# Patient Record
Sex: FEMALE | Race: WHITE | NOT HISPANIC OR LATINO | Employment: UNEMPLOYED | URBAN - METROPOLITAN AREA
[De-identification: names, ages, dates, MRNs, and addresses within clinical notes are randomized per-mention and may not be internally consistent; named-entity substitution may affect disease eponyms.]

---

## 2023-02-23 ENCOUNTER — OFFICE VISIT (OUTPATIENT)
Dept: PRIMARY CARE CLINIC | Facility: CLINIC | Age: 43
End: 2023-02-23
Payer: COMMERCIAL

## 2023-02-23 VITALS
WEIGHT: 141.13 LBS | DIASTOLIC BLOOD PRESSURE: 62 MMHG | BODY MASS INDEX: 25.01 KG/M2 | TEMPERATURE: 99 F | HEART RATE: 83 BPM | SYSTOLIC BLOOD PRESSURE: 108 MMHG | RESPIRATION RATE: 16 BRPM | OXYGEN SATURATION: 99 % | HEIGHT: 63 IN

## 2023-02-23 DIAGNOSIS — E88.810 METABOLIC SYNDROME: ICD-10-CM

## 2023-02-23 DIAGNOSIS — E28.2 PCOS (POLYCYSTIC OVARIAN SYNDROME): ICD-10-CM

## 2023-02-23 DIAGNOSIS — E78.5 HYPERLIPIDEMIA, UNSPECIFIED HYPERLIPIDEMIA TYPE: ICD-10-CM

## 2023-02-23 DIAGNOSIS — L65.9 HAIR LOSS: ICD-10-CM

## 2023-02-23 DIAGNOSIS — F32.A ANXIETY AND DEPRESSION: ICD-10-CM

## 2023-02-23 DIAGNOSIS — E78.41 ELEVATED LIPOPROTEIN(A): ICD-10-CM

## 2023-02-23 DIAGNOSIS — F41.9 ANXIETY AND DEPRESSION: ICD-10-CM

## 2023-02-23 DIAGNOSIS — E34.9 HORMONE DISORDER: Primary | ICD-10-CM

## 2023-02-23 DIAGNOSIS — R63.5 WEIGHT GAIN: ICD-10-CM

## 2023-02-23 DIAGNOSIS — E55.9 VITAMIN D DEFICIENCY: ICD-10-CM

## 2023-02-23 PROCEDURE — 3074F PR MOST RECENT SYSTOLIC BLOOD PRESSURE < 130 MM HG: ICD-10-PCS | Mod: CPTII,S$GLB,, | Performed by: FAMILY MEDICINE

## 2023-02-23 PROCEDURE — 99999 PR PBB SHADOW E&M-NEW PATIENT-LVL III: ICD-10-PCS | Mod: PBBFAC,,, | Performed by: FAMILY MEDICINE

## 2023-02-23 PROCEDURE — 1159F MED LIST DOCD IN RCRD: CPT | Mod: CPTII,S$GLB,, | Performed by: FAMILY MEDICINE

## 2023-02-23 PROCEDURE — 99215 OFFICE O/P EST HI 40 MIN: CPT | Mod: S$GLB,,, | Performed by: FAMILY MEDICINE

## 2023-02-23 PROCEDURE — 99999 PR PBB SHADOW E&M-NEW PATIENT-LVL III: CPT | Mod: PBBFAC,,, | Performed by: FAMILY MEDICINE

## 2023-02-23 PROCEDURE — 3008F PR BODY MASS INDEX (BMI) DOCUMENTED: ICD-10-PCS | Mod: CPTII,S$GLB,, | Performed by: FAMILY MEDICINE

## 2023-02-23 PROCEDURE — 1159F PR MEDICATION LIST DOCUMENTED IN MEDICAL RECORD: ICD-10-PCS | Mod: CPTII,S$GLB,, | Performed by: FAMILY MEDICINE

## 2023-02-23 PROCEDURE — 3074F SYST BP LT 130 MM HG: CPT | Mod: CPTII,S$GLB,, | Performed by: FAMILY MEDICINE

## 2023-02-23 PROCEDURE — 3008F BODY MASS INDEX DOCD: CPT | Mod: CPTII,S$GLB,, | Performed by: FAMILY MEDICINE

## 2023-02-23 PROCEDURE — 3078F DIAST BP <80 MM HG: CPT | Mod: CPTII,S$GLB,, | Performed by: FAMILY MEDICINE

## 2023-02-23 PROCEDURE — 1160F PR REVIEW ALL MEDS BY PRESCRIBER/CLIN PHARMACIST DOCUMENTED: ICD-10-PCS | Mod: CPTII,S$GLB,, | Performed by: FAMILY MEDICINE

## 2023-02-23 PROCEDURE — 99215 PR OFFICE/OUTPT VISIT, EST, LEVL V, 40-54 MIN: ICD-10-PCS | Mod: S$GLB,,, | Performed by: FAMILY MEDICINE

## 2023-02-23 PROCEDURE — 3078F PR MOST RECENT DIASTOLIC BLOOD PRESSURE < 80 MM HG: ICD-10-PCS | Mod: CPTII,S$GLB,, | Performed by: FAMILY MEDICINE

## 2023-02-23 PROCEDURE — 1160F RVW MEDS BY RX/DR IN RCRD: CPT | Mod: CPTII,S$GLB,, | Performed by: FAMILY MEDICINE

## 2023-02-23 RX ORDER — SAXAGLIPTIN 5 MG/1
5 TABLET, FILM COATED ORAL DAILY
Qty: 90 TABLET | Refills: 1 | Status: SHIPPED | OUTPATIENT
Start: 2023-02-23 | End: 2023-08-25

## 2023-02-23 RX ORDER — SEMAGLUTIDE 1.34 MG/ML
1 INJECTION, SOLUTION SUBCUTANEOUS
Qty: 3 PEN | Refills: 1 | Status: SHIPPED | OUTPATIENT
Start: 2023-02-23 | End: 2023-08-25 | Stop reason: SDUPTHER

## 2023-02-23 RX ORDER — DIETHYLPROPION HYDROCHLORIDE 75 MG/1
75 TABLET, EXTENDED RELEASE ORAL EVERY MORNING
Qty: 90 TABLET | Refills: 0 | Status: SHIPPED | OUTPATIENT
Start: 2023-02-23 | End: 2023-06-05 | Stop reason: SDUPTHER

## 2023-02-23 RX ORDER — ME-TETRAHYDROFOLATE/B12/HRB236 1-1-500 MG
1 CAPSULE ORAL DAILY
Qty: 90 CAPSULE | Refills: 1 | Status: SHIPPED | OUTPATIENT
Start: 2023-02-23 | End: 2023-03-27 | Stop reason: SDUPTHER

## 2023-02-23 RX ORDER — DIETHYLPROPION HYDROCHLORIDE 75 MG/1
TABLET, EXTENDED RELEASE ORAL
COMMUNITY
Start: 2019-11-23 | End: 2023-02-23 | Stop reason: SDUPTHER

## 2023-02-23 RX ORDER — ASPIRIN 325 MG
TABLET, DELAYED RELEASE (ENTERIC COATED) ORAL
COMMUNITY
End: 2023-02-23 | Stop reason: SDUPTHER

## 2023-02-23 RX ORDER — SPIRONOLACTONE 100 MG/1
TABLET, FILM COATED ORAL
COMMUNITY
Start: 2019-11-23 | End: 2023-02-23 | Stop reason: SDUPTHER

## 2023-02-23 RX ORDER — FINASTERIDE 1 MG/1
TABLET, FILM COATED ORAL
COMMUNITY
End: 2023-02-23 | Stop reason: SDUPTHER

## 2023-02-23 RX ORDER — SAXAGLIPTIN 5 MG/1
TABLET, FILM COATED ORAL
COMMUNITY
End: 2023-02-23 | Stop reason: SDUPTHER

## 2023-02-23 RX ORDER — ASPIRIN 325 MG
50000 TABLET, DELAYED RELEASE (ENTERIC COATED) ORAL
Qty: 12 CAPSULE | Refills: 1 | Status: SHIPPED | OUTPATIENT
Start: 2023-02-23 | End: 2024-02-05 | Stop reason: SDUPTHER

## 2023-02-23 RX ORDER — FINASTERIDE 1 MG/1
1 TABLET, FILM COATED ORAL DAILY
Qty: 90 TABLET | Refills: 1 | Status: SHIPPED | OUTPATIENT
Start: 2023-02-23 | End: 2023-08-25 | Stop reason: SDUPTHER

## 2023-02-23 RX ORDER — SPIRONOLACTONE 100 MG/1
100 TABLET, FILM COATED ORAL DAILY
Qty: 90 TABLET | Refills: 1 | Status: SHIPPED | OUTPATIENT
Start: 2023-02-23 | End: 2023-08-25 | Stop reason: SDUPTHER

## 2023-02-23 RX ORDER — SEMAGLUTIDE 1.34 MG/ML
INJECTION, SOLUTION SUBCUTANEOUS
COMMUNITY
Start: 2019-11-23 | End: 2023-02-23 | Stop reason: SDUPTHER

## 2023-02-23 NOTE — PROGRESS NOTES
"        [facility]       [address] [place] [address]      [phone]     Kelly Mooney MD         .      Office Visit  02/23/2023      Subjective      HPI:  Mello Carreon is a 42 y.o. female presents today in clinic for "Follow-up  ."     Ear from [facility].  She was doing great she has a history of PCOS and metabolic syndrome and still seeing dr medrano. Patient is needing to get labs, patient is feeling great and is satisfied with her med regimen.  Diet is good.  She stays active.  Weight is stable.  Sleep is good..    REVIEW OF SYMPTOMS  General: negative   Psychological ROS: negative ..  Endocrine ROS: negative  Ophthalmic ROS: negative   ENT ROS: negative .  Cardiovascular ROS: negative   Breast ROS: negative   Respiratory ROS: negative   Gastrointestinal ROS: negative   Genito-Urinary ROS: negative   Musculoskeletal ROS: negative   Dermatological ROS: negative  Neurological ROS: negative  Hematological and Lymphatic ROS: denies             The following were updated and reviewed by myself in the chart: medications, past medical history, past surgical history, family history, social history, and allergies.     MEDICATIONS    Current Outpatient Medications:     cyanocobalamin (VITAMIN B-12) 1000 MCG tablet, Take 2,500 mcg by mouth. , Disp: , Rfl:     cholecalciferol, vitamin D3, 1,250 mcg (50,000 unit) capsule, Take 1 capsule (50,000 Units total) by mouth every 7 days., Disp: 12 capsule, Rfl: 1    diethylpropion (TENUATE) 75 mg SR tablet, Take 1 tablet (75 mg total) by mouth every morning., Disp: 90 tablet, Rfl: 0    finasteride (PROPECIA) 1 mg tablet, Take 1 tablet (1 mg total) by mouth once daily., Disp: 90 tablet, Rfl: 1    [id number] (RHEUMATE, WITH QUATREFOLIC,) 1-1-500 mg Cap, Take 1 tablet by mouth once daily., Disp: 90 capsule, Rfl: 1    SAXagliptin (ONGLYZA) 5 mg Tab tablet, Take 1 tablet (5 mg total) " "by mouth once daily., Disp: 90 tablet, Rfl: 1    semaglutide (OZEMPIC) 1 mg/dose (4 mg/3 mL), Inject 1 mg into the skin every 7 days., Disp: 3 pen, Rfl: 1    spironolactone (ALDACTONE) 100 MG tablet, Take 1 tablet (100 mg total) by mouth once daily., Disp: 90 tablet, Rfl: 1    ALLERGIES  Review of patient's allergies indicates:   Allergen Reactions    Cefaclor Swelling     Closes throat up    Cephalexin Swelling    Penicillins Anaphylaxis    Sulfa (sulfonamide antibiotics) Swelling    Sulfamethoxazole-trimethoprim Swelling    Metformin     Levofloxacin Palpitations     Causes heart to race          /62   Pulse 83   Temp 98.8 °F (37.1 °C)   Resp 16   Ht 5' 3" (1.6 m)   Wt 64 kg (141 lb 1.5 oz)   SpO2 99%   BMI 24.99 kg/m²   Wt Readings from Last 3 Encounters:   02/23/23 64 kg (141 lb 1.5 oz)   11/13/15 84.5 kg (186 lb 2.9 oz)   10/20/15 84.1 kg (185 lb 6.5 oz)     BP Readings from Last 3 Encounters:   02/23/23 108/62   11/13/15 104/72   10/20/15 96/68          PHYSICAL EXAM:     Physical Exam:    Constitutional:   General Appearance:  healthy and alert  Body Habitus: normoweight  Level of Distress: NAD   Ambulation: ambulates independently  Psychiatric:   Appearance: well dressed, appropriate  Speech rapid  Cognition: memory normal   Orientation: orientated to time, place and person   Mood: normal and pleasant and appropriate  Movement: normal and sitting calmly  Head: normocephalic and atraumatic   Eyes:    Eye Inspection: grossly normal , EOMI  Pupils: PERRLA  Conjunctivae: clear  Lids: normal eyelids  ENMT:     Neck:   Neck: normal, supple with no adenopathy or mass  Thyroid: normal thyroid appearance and examination  Lungs: Respiratory exam normal and unlabored;   Cardiovascular: Cardiovascular exam normal with RRR  Abdomen: soft and non-distended with no tenderness   Musculoskeletal: Musculoskeletal exam is normal  for strength, tone and ROM  Extremities: Extremities are normal on exam  Neurologic: " Neurological exam is normal and grossly intact  Skin:   Inspection and palpation: Skin exam is normal  Nails: Nails are normal  Hair Texture: hair is normal        ASSESSMENT AND PLAN      1. Hormone disorder  Common findings associated with PCOS are many but hormone imbalance in women is the defining feature of PCOS. Related symptoms include irregular periods, weight gain, infertility, acne and excess hair growth. Discussed how diet and/or medication can help. Medications and diet can be effective in controlling PCOS as well as other otc supplements such as kate-inositol choline, Glucorein HOP, DIM 200mg for androgenic symptoms, and saw palmetto 250mg for hair loss or thinning.  Controlled on meds   -     Progesterone; Future; Expected date: 02/23/2023  -     DHEA-Sulfate; Future; Expected date: 02/23/2023  -     Estrone; Future; Expected date: 02/23/2023  -     Estradiol; Future; Expected date: 02/23/2023  -     Luteinizing Hormone; Future; Expected date: 02/23/2023  -     Follicle Stimulating Hormone; Future; Expected date: 02/23/2023  -     Testosterone, Free; Future; Expected date: 02/23/2023  -     Testosterone, Total, LC/MS/MS; Future; Expected date: 02/23/2023  -     Misc Sendout Test, Blood cardio !Q- advanced lipid panel; Future; Expected date: 02/23/2023  -     Misc Sendout Test, Blood lipoprotein A cardio IQ; Future; Expected date: 02/23/2023    2. Elevated lipoprotein(a)  Strong family history.  Discuss starting medications.  We will look at labs.  Otherwise all cardiometabolic risk factors are controlled on current regimen  -     diethylpropion (TENUATE) 75 mg SR tablet; Take 1 tablet (75 mg total) by mouth every morning.  Dispense: 90 tablet; Refill: 0  -     [id number] (RHEUMATE, WITH QUATREFOLIC,) 1-1-500 mg Cap; Take 1 tablet by mouth once daily.  Dispense: 90 capsule; Refill: 1  -     CRP, High Sensitivity; Future; Expected date: 02/23/2023  -     Lipoprotein A (LPA); Future;  Expected date: 02/23/2023  -     Cardio Iq(R) Apolipoprotein B; Future; Expected date: 02/23/2023    3. Metabolic syndrome  Pathophysiology of insulin resistance discussed with patient and therapeutic options were explained.  Dietary and lifestyle changes are recommended for the treatment of insulin resistance.   Low carbohydrate diet and/or possible intermittent fasting is recommended for insulin resistance and/or prediabetes.  Non FDA approved treatment options include but is not limited to GLP's, SGLT-2's, biguanides, and other glucose support supplements. Patient will notify us with any concerns or complaints regarding treatment plan.Weight loss is strongly encouraged and is emphasized to help reduce risk of diabetes.  Has been on combination of Onglyza and Ozempic and has done very well and levels are stable and improved.  No changes to current regimen  -     CBC Auto Differential; Future; Expected date: 02/23/2023  -     Comprehensive Metabolic Panel; Future; Expected date: 02/23/2023  -     Hemoglobin A1C; Future; Expected date: 02/23/2023  -     SAXagliptin (ONGLYZA) 5 mg Tab tablet; Take 1 tablet (5 mg total) by mouth once daily.  Dispense: 90 tablet; Refill: 1  -     semaglutide (OZEMPIC) 1 mg/dose (4 mg/3 mL); Inject 1 mg into the skin every 7 days.  Dispense: 3 pen; Refill: 1    4. PCOS (polycystic ovarian syndrome)  Common findings associated with PCOS are many but hormone imbalance in women is the defining feature of PCOS. Related symptoms include irregular periods, weight gain, infertility, acne and excess hair growth. Discussed how diet and/or medication can help. Medications and diet can be effective in controlling PCOS as well as other otc supplements such as kate-inositol choline, Glucorein HOP, DIM 200mg for androgenic symptoms, and saw palmetto 250mg for hair loss or thinning.  Doing well on current regimen  -     SAXagliptin (ONGLYZA) 5 mg Tab tablet; Take 1 tablet (5 mg total) by mouth once  daily.  Dispense: 90 tablet; Refill: 1  -     semaglutide (OZEMPIC) 1 mg/dose (4 mg/3 mL); Inject 1 mg into the skin every 7 days.  Dispense: 3 pen; Refill: 1    5. Anxiety and depression  It is advised to identify triggers for anxiety and consider the impact of anxious thinking on functioning. Discussed strategies to regulate symptoms and need for compliance with treatment.  Therapy or counseling may be necessary if current regimen is ineffective. Patient will report any worsening or change in anxiety symptoms if necessary.  Controlled on medication    6. Hyperlipidemia, unspecified hyperlipidemia type  Reviewed importance of advanced lipid profiles. Advise daily exercise. Diet is recommended. Patients are encouraged to obtain healthy BMI weight. Risks associated with high cholesterol are well established and include but are not limited to heart disease, stroke and peripheral vascular disease. Patient should not smoke. Goal LDL particle number is <1000 and ApoB <70. Basic LDL is below 100 or below 70 if diabetic. Some non-FDA approved dietary supplements that may be beneficial to patient include but is not limited to high fiber diet at least 30g daily; niacin ER non flush free variety 500mg-1,000mg; Omega-3 fish oil DHA+EPA = 1,000mg twice daily; baby dose aspirin 81mg; co-enzyme q10 500mg to help reduce statin-induced myalgia; red rice yeast 1200mg twice daily; berberine 1000mg-2000mg daily. Patient is encouraged to exercise routinely and adhere to heart healthy diet with Mediterranean diet showing the most consistent data to help with lipid management.      7. Hair loss  Controlled  -     finasteride (PROPECIA) 1 mg tablet; Take 1 tablet (1 mg total) by mouth once daily.  Dispense: 90 tablet; Refill: 1  -     spironolactone (ALDACTONE) 100 MG tablet; Take 1 tablet (100 mg total) by mouth once daily.  Dispense: 90 tablet; Refill: 1    8. Vitamin D deficiency  On replacement there  -     cholecalciferol, vitamin  D3, 1,250 mcg (50,000 unit) capsule; Take 1 capsule (50,000 Units total) by mouth every 7 days.  Dispense: 12 capsule; Refill: 1    9. Weight gain  Improved and weight is at optimal goal  -     Misc Sendout Test, Blood free t3; Future; Expected date: 02/23/2023         FOLLOW-UP:  No follow-ups on file.    I spent a total of 40 minutes face to face and non-face to face on the date of this visit.This includes time preparing to see the patient (eg, review of tests, notes), obtaining and/or reviewing additional history from an independent historian and/or outside medical records, documenting clinical information in the electronic health record, independently interpreting results and/or communicating results to the patient/family/caregiver, or care coordinator.    Signed by:  Kelly Mooney MD

## 2023-02-28 LAB
ALBUMIN SERPL-MCNC: 4.4 G/DL (ref 3.6–5.1)
ALBUMIN/GLOB SERPL: 1.8 (CALC) (ref 1–2.5)
ALP SERPL-CCNC: 55 U/L (ref 31–125)
ALT SERPL-CCNC: 9 U/L (ref 6–29)
AST SERPL-CCNC: 15 U/L (ref 10–30)
BASOPHILS # BLD AUTO: 30 CELLS/UL (ref 0–200)
BASOPHILS NFR BLD AUTO: 0.5 %
BILIRUB SERPL-MCNC: 0.7 MG/DL (ref 0.2–1.2)
BUN SERPL-MCNC: 14 MG/DL (ref 7–25)
BUN/CREAT SERPL: NORMAL (CALC) (ref 6–22)
CALCIUM SERPL-MCNC: 9.2 MG/DL (ref 8.6–10.2)
CHLORIDE SERPL-SCNC: 105 MMOL/L (ref 98–110)
CO2 SERPL-SCNC: 28 MMOL/L (ref 20–32)
CREAT SERPL-MCNC: 0.7 MG/DL (ref 0.5–0.99)
DHEA-S SERPL-MCNC: 106 MCG/DL (ref 15–205)
EGFR: 110 ML/MIN/1.73M2
EOSINOPHIL # BLD AUTO: 150 CELLS/UL (ref 15–500)
EOSINOPHIL NFR BLD AUTO: 2.5 %
ERYTHROCYTE [DISTWIDTH] IN BLOOD BY AUTOMATED COUNT: 13.3 % (ref 11–15)
ESTRADIOL SERPL-MCNC: 89 PG/ML
GLOBULIN SER CALC-MCNC: 2.5 G/DL (CALC) (ref 1.9–3.7)
GLUCOSE SERPL-MCNC: 82 MG/DL (ref 65–99)
HBA1C MFR BLD: 4.6 % OF TOTAL HGB
HCT VFR BLD AUTO: 40.3 % (ref 35–45)
HGB BLD-MCNC: 13.3 G/DL (ref 11.7–15.5)
LH SERPL-ACNC: 2.6 MIU/ML
LYMPHOCYTES # BLD AUTO: 1860 CELLS/UL (ref 850–3900)
LYMPHOCYTES NFR BLD AUTO: 31 %
MCH RBC QN AUTO: 30.6 PG (ref 27–33)
MCHC RBC AUTO-ENTMCNC: 33 G/DL (ref 32–36)
MCV RBC AUTO: 92.9 FL (ref 80–100)
MONOCYTES # BLD AUTO: 432 CELLS/UL (ref 200–950)
MONOCYTES NFR BLD AUTO: 7.2 %
NEUTROPHILS # BLD AUTO: 3528 CELLS/UL (ref 1500–7800)
NEUTROPHILS NFR BLD AUTO: 58.8 %
PLATELET # BLD AUTO: 264 THOUSAND/UL (ref 140–400)
PMV BLD REES-ECKER: 10 FL (ref 7.5–12.5)
POTASSIUM SERPL-SCNC: 4.4 MMOL/L (ref 3.5–5.3)
PROGEST SERPL-MCNC: <0.5 NG/ML
PROT SERPL-MCNC: 6.9 G/DL (ref 6.1–8.1)
RBC # BLD AUTO: 4.34 MILLION/UL (ref 3.8–5.1)
SODIUM SERPL-SCNC: 138 MMOL/L (ref 135–146)
TESTOST SERPL-MCNC: 21 NG/DL (ref 2–45)
WBC # BLD AUTO: 6 THOUSAND/UL (ref 3.8–10.8)

## 2023-03-01 DIAGNOSIS — Z12.31 OTHER SCREENING MAMMOGRAM: ICD-10-CM

## 2023-03-02 LAB
CRP SERPL HS-MCNC: 0.4 MG/L
ESTRONE SERPL-MCNC: 34 PG/ML
FSH SERPL-ACNC: 5.1 MIU/ML
LPA SERPL-SCNC: 175 NMOL/L
T3FREE SERPL-MCNC: 3.1 PG/ML (ref 2.3–4.2)
TESTOST FREE SERPL-MCNC: 1 PG/ML (ref 0.2–5)

## 2023-03-08 DIAGNOSIS — Z12.31 OTHER SCREENING MAMMOGRAM: ICD-10-CM

## 2023-03-27 ENCOUNTER — PATIENT MESSAGE (OUTPATIENT)
Dept: PRIMARY CARE CLINIC | Facility: CLINIC | Age: 43
End: 2023-03-27
Payer: COMMERCIAL

## 2023-06-05 DIAGNOSIS — E78.41 ELEVATED LIPOPROTEIN(A): ICD-10-CM

## 2023-06-06 RX ORDER — DIETHYLPROPION HYDROCHLORIDE 75 MG/1
75 TABLET, EXTENDED RELEASE ORAL EVERY MORNING
Qty: 90 TABLET | Refills: 0 | Status: SHIPPED | OUTPATIENT
Start: 2023-06-06 | End: 2023-08-25 | Stop reason: SDUPTHER

## 2023-08-25 ENCOUNTER — OFFICE VISIT (OUTPATIENT)
Dept: PRIMARY CARE CLINIC | Facility: CLINIC | Age: 43
End: 2023-08-25
Payer: COMMERCIAL

## 2023-08-25 VITALS
DIASTOLIC BLOOD PRESSURE: 74 MMHG | SYSTOLIC BLOOD PRESSURE: 120 MMHG | HEIGHT: 63 IN | WEIGHT: 145.31 LBS | TEMPERATURE: 98 F | HEART RATE: 94 BPM | OXYGEN SATURATION: 97 % | BODY MASS INDEX: 25.75 KG/M2 | RESPIRATION RATE: 18 BRPM

## 2023-08-25 DIAGNOSIS — G89.29 CHRONIC RIGHT HIP PAIN: ICD-10-CM

## 2023-08-25 DIAGNOSIS — E28.2 PCOS (POLYCYSTIC OVARIAN SYNDROME): Primary | ICD-10-CM

## 2023-08-25 DIAGNOSIS — F41.9 ANXIETY AND DEPRESSION: ICD-10-CM

## 2023-08-25 DIAGNOSIS — F32.A ANXIETY AND DEPRESSION: ICD-10-CM

## 2023-08-25 DIAGNOSIS — Z86.39 HISTORY OF OBESITY: ICD-10-CM

## 2023-08-25 DIAGNOSIS — E78.41 ELEVATED LIPOPROTEIN(A): ICD-10-CM

## 2023-08-25 DIAGNOSIS — E78.5 HYPERLIPIDEMIA, UNSPECIFIED HYPERLIPIDEMIA TYPE: ICD-10-CM

## 2023-08-25 DIAGNOSIS — M25.551 CHRONIC RIGHT HIP PAIN: ICD-10-CM

## 2023-08-25 DIAGNOSIS — E55.9 VITAMIN D DEFICIENCY: ICD-10-CM

## 2023-08-25 DIAGNOSIS — E88.810 METABOLIC SYNDROME: ICD-10-CM

## 2023-08-25 DIAGNOSIS — L65.9 HAIR LOSS: ICD-10-CM

## 2023-08-25 PROCEDURE — 3074F PR MOST RECENT SYSTOLIC BLOOD PRESSURE < 130 MM HG: ICD-10-PCS | Mod: CPTII,S$GLB,, | Performed by: FAMILY MEDICINE

## 2023-08-25 PROCEDURE — 3074F SYST BP LT 130 MM HG: CPT | Mod: CPTII,S$GLB,, | Performed by: FAMILY MEDICINE

## 2023-08-25 PROCEDURE — 3044F PR MOST RECENT HEMOGLOBIN A1C LEVEL <7.0%: ICD-10-PCS | Mod: CPTII,S$GLB,, | Performed by: FAMILY MEDICINE

## 2023-08-25 PROCEDURE — 3008F BODY MASS INDEX DOCD: CPT | Mod: CPTII,S$GLB,, | Performed by: FAMILY MEDICINE

## 2023-08-25 PROCEDURE — 99215 OFFICE O/P EST HI 40 MIN: CPT | Mod: S$GLB,,, | Performed by: FAMILY MEDICINE

## 2023-08-25 PROCEDURE — 3078F PR MOST RECENT DIASTOLIC BLOOD PRESSURE < 80 MM HG: ICD-10-PCS | Mod: CPTII,S$GLB,, | Performed by: FAMILY MEDICINE

## 2023-08-25 PROCEDURE — 99999 PR PBB SHADOW E&M-EST. PATIENT-LVL IV: CPT | Mod: PBBFAC,,, | Performed by: FAMILY MEDICINE

## 2023-08-25 PROCEDURE — 3078F DIAST BP <80 MM HG: CPT | Mod: CPTII,S$GLB,, | Performed by: FAMILY MEDICINE

## 2023-08-25 PROCEDURE — 99215 PR OFFICE/OUTPT VISIT, EST, LEVL V, 40-54 MIN: ICD-10-PCS | Mod: S$GLB,,, | Performed by: FAMILY MEDICINE

## 2023-08-25 PROCEDURE — 3008F PR BODY MASS INDEX (BMI) DOCUMENTED: ICD-10-PCS | Mod: CPTII,S$GLB,, | Performed by: FAMILY MEDICINE

## 2023-08-25 PROCEDURE — 3044F HG A1C LEVEL LT 7.0%: CPT | Mod: CPTII,S$GLB,, | Performed by: FAMILY MEDICINE

## 2023-08-25 PROCEDURE — 99999 PR PBB SHADOW E&M-EST. PATIENT-LVL IV: ICD-10-PCS | Mod: PBBFAC,,, | Performed by: FAMILY MEDICINE

## 2023-08-25 PROCEDURE — 1159F PR MEDICATION LIST DOCUMENTED IN MEDICAL RECORD: ICD-10-PCS | Mod: CPTII,S$GLB,, | Performed by: FAMILY MEDICINE

## 2023-08-25 PROCEDURE — 1159F MED LIST DOCD IN RCRD: CPT | Mod: CPTII,S$GLB,, | Performed by: FAMILY MEDICINE

## 2023-08-25 RX ORDER — SPIRONOLACTONE 100 MG/1
100 TABLET, FILM COATED ORAL DAILY
Qty: 90 TABLET | Refills: 1 | Status: SHIPPED | OUTPATIENT
Start: 2023-08-25 | End: 2023-10-09 | Stop reason: SDUPTHER

## 2023-08-25 RX ORDER — DIETHYLPROPION HYDROCHLORIDE 75 MG/1
75 TABLET, EXTENDED RELEASE ORAL EVERY MORNING
Qty: 90 TABLET | Refills: 0 | Status: SHIPPED | OUTPATIENT
Start: 2023-08-25 | End: 2023-12-03 | Stop reason: SDUPTHER

## 2023-08-25 RX ORDER — FINASTERIDE 1 MG/1
1 TABLET, FILM COATED ORAL DAILY
Qty: 90 TABLET | Refills: 1 | Status: SHIPPED | OUTPATIENT
Start: 2023-08-25 | End: 2024-02-05 | Stop reason: SDUPTHER

## 2023-08-25 RX ORDER — SEMAGLUTIDE 1.34 MG/ML
1 INJECTION, SOLUTION SUBCUTANEOUS
Qty: 3 EACH | Refills: 1 | Status: SHIPPED | OUTPATIENT
Start: 2023-08-25 | End: 2023-09-19

## 2023-08-25 NOTE — PROGRESS NOTES
"    [facility]       [address] [place] [address]      [phone]     Kelly Mooney MD         .      Office Visit  2023  [id number]      SUBJECTIVE     HPI:  Mello Carreon is a 42 y.o. female presents today in clinic for "Follow-up  ."   Patient is here for routine follow-up.  She is been doing very well on current regimen.  She is been able to sustain her weight loss very well with the diet pill and GLP therapy.  She is struggling to get the Onglyza so after she runs out we have already discussed that it was okay for her to start.  She is still complaining about chronic and progressive right hip pain.  She is been going to her physical therapist and working out but certain maneuvers seem to be making it worse mainly when sitting for prolonged periods of time.  She much prefers to stay upright.  It feels like a burning pain.  She denies any injury.  She does have a left hip of the right hip is worse.  She is still taking the rheumate daily.     Follow-up        ROS   Review of symptoms are negative except as noted.     PAST MEDICAL HISTORY     Past Medical History:   Diagnosis Date    Asthma     Endometriosis 1997    Insulin resistance 2015    PCOS (polycystic ovarian syndrome)        Past Surgical History:   Procedure Laterality Date    BLADDER SURGERY  1982     SECTION      FOOT SURGERY Left     5th toe     HYSTERECTOMY      LASER ABLATION      uterus ablation    TUBAL LIGATION         Social History     Socioeconomic History    Marital status:    Tobacco Use    Smoking status: Never    Smokeless tobacco: Never    Tobacco comments:     None   Substance and Sexual Activity    Alcohol use: No    Drug use: No    Sexual activity: Yes     Partners: Male     Birth control/protection: None     Comment:        Allergies as of 2023 - Reviewed 2023   Allergen Reaction Noted    " "Cefaclor Swelling 09/14/2015    Cephalexin Swelling 09/14/2015    Penicillins Anaphylaxis 05/27/2014    Sulfa (sulfonamide antibiotics) Swelling 09/14/2015    Sulfamethoxazole-trimethoprim Swelling 09/14/2015    Metformin  09/14/2015    Levofloxacin Palpitations 09/14/2015       HOME MEDS     Current Outpatient Medications   Medication Instructions    cholecalciferol (vitamin D3) 50,000 Units, Oral, Every 7 days    cyanocobalamin (VITAMIN B-12) 2,500 mcg, Oral    diethylpropion (TENUATE) 75 mg, Oral, Every morning    finasteride (PROPECIA) 1 mg, Oral, Daily    [id number] (RHEUMATE, WITH QUATREFOLIC,) 1-1-500 mg Cap 1 tablet, Oral, Daily    OZEMPIC 1 mg, Subcutaneous, Every 7 days    spironolactone (ALDACTONE) 100 mg, Oral, Daily       The following were updated and reviewed by myself in the chart: medications, past medical history, past surgical history, family history, social history, and allergies.        Objective    OBJECTIVE   /74   Pulse 94   Temp 97.8 °F (36.6 °C) (Temporal)   Resp 18   Ht 5' 3" (1.6 m)   Wt 65.9 kg (145 lb 4.5 oz)   SpO2 97%   BMI 25.74 kg/m²     Wt Readings from Last 2 Encounters:   08/25/23 65.9 kg (145 lb 4.5 oz)   02/23/23 64 kg (141 lb 1.5 oz)       BP Readings from Last 2 Encounters:   08/25/23 120/74   02/23/23 108/62          Physical Exam  Vitals and nursing note reviewed.   Constitutional:       Appearance: Normal appearance. She is normal weight.   HENT:      Head: Normocephalic and atraumatic.      Nose: Nose normal.   Eyes:      Extraocular Movements: Extraocular movements intact.      Conjunctiva/sclera: Conjunctivae normal.      Pupils: Pupils are equal, round, and reactive to light.   Cardiovascular:      Rate and Rhythm: Normal rate and regular rhythm.   Pulmonary:      Effort: Pulmonary effort is normal.      Breath sounds: Normal breath sounds.   Abdominal:      General: Abdomen is flat.      Palpations: Abdomen is soft.      Tenderness: " There is no abdominal tenderness.   Musculoskeletal:         General: Tenderness (Right hip bursitis) present. No swelling. Normal range of motion.      Cervical back: Normal range of motion.   Lymphadenopathy:      Cervical: No cervical adenopathy.   Skin:     General: Skin is warm.      Findings: No lesion or rash.   Neurological:      General: No focal deficit present.      Mental Status: She is alert. Mental status is at baseline.   Psychiatric:         Mood and Affect: Mood normal.         Behavior: Behavior normal.               LABS      LAB RESULTS, IF AVAILABLE, ARE DISCUSSED WITH PATIENT AND POSTED TO PATIENT PORTAL     ASSESSMENT & PLAN     1. PCOS (polycystic ovarian syndrome)  Overview:  Common findings associated with PCOS are many but hormone imbalance in women is the defining feature of PCOS. Related symptoms include irregular periods, weight gain, infertility, acne and excess hair growth. Discussed how diet and/or medication can help. Medications and diet can be effective in controlling PCOS as well as other otc supplements such as kate-inositol choline, Glucorein HOP, DIM 200mg for androgenic symptoms, and saw palmetto 250mg for hair loss or thinning.      Assessment & Plan:  Seems well-controlled on current regimen.  We will look at some of the hormones when she comes back in six months.    Orders:  -     semaglutide (OZEMPIC) 1 mg/dose (4 mg/3 mL); Inject 1 mg into the skin every 7 days.  Dispense: 3 each; Refill: 1    2. Elevated lipoprotein(a)    3. Metabolic syndrome  Assessment & Plan:  Has been well-controlled on current regimen but as discussed previously, okay to stop the Onglyza as she is already taking GLP therapy    Orders:  -     semaglutide (OZEMPIC) 1 mg/dose (4 mg/3 mL); Inject 1 mg into the skin every 7 days.  Dispense: 3 each; Refill: 1    4. Hyperlipidemia, unspecified hyperlipidemia type  Overview:  Reviewed importance of advanced lipid profiles. Advise daily exercise. Diet is  recommended. Patients are encouraged to obtain healthy BMI weight. Risks associated with high cholesterol are well established and include but are not limited to heart disease, stroke and peripheral vascular disease. Patient should not smoke. Goal LDL particle number is <1000 and ApoB <70. Basic LDL is below 100 or below 70 if diabetic. Some non-FDA approved dietary supplements that may be beneficial to patient include but is not limited to high fiber diet at least 30g daily; niacin ER non flush free variety 500mg-1,000mg; Omega-3 fish oil DHA+EPA = 1,000mg twice daily; baby dose aspirin 81mg; co-enzyme q10 500mg to help reduce statin-induced myalgia; red rice yeast 1200mg twice daily; berberine 1000mg-2000mg daily. Patient is encouraged to exercise routinely and adhere to heart healthy diet with Mediterranean diet showing the most consistent data to help with lipid management.      Assessment & Plan:  Diet controlled.  We will monitor for now.      5. Anxiety and depression  Overview:  It is advised to identify triggers for anxiety and consider the impact of anxious thinking on functioning. Discussed strategies to regulate symptoms and need for compliance with treatment.  Therapy or counseling may be necessary if current regimen is ineffective. Patient will report any worsening or change in anxiety symptoms if necessary.        6. History of obesity  Overview:  There are multiple etiologies of weight gain. A  weight loss plan that focuses on diet, exercise and good healthy lifestyle changes are a necessity to help combat obesity. Medication and/orsurgical options are available;  unfortunately,  many options may not be approved by insurance nor FDA approved for obesity but could be very beneficial. There are possible risks associated with therapeutic options and patient should notify us of any concerns. Patients should adhere to plan and follow up routinely as directed.     Assessment & Plan:  Risk and benefits were  "discussed with patient with diet medications. Patient should contact us if any adverse reaction occurs including but not limited to insomnia, tachycardia, anxiety, and/or weakness. Patient understands the importance of diet and exercise to improve his/her weight loss efforts. Consideration to stay on anti-obesity medications will be made at each office visit if patient is seeing benefits of such medication and shows improvement in overall metabolic health.        7. Vitamin D deficiency    8. Hair loss    9. Chronic right hip pain          I spent a total of 41 minutes face to face and non-face to face on the date of this visit.This includes time preparing to see the patient (eg, review of tests, notes), obtaining and/or reviewing additional history from an independent historian and/or outside medical records, documenting clinical information in the electronic health record, independently interpreting results and/or communicating results to the patient/family/caregiver, or care coordinator.      Disclaimer: Portions of this record may have been created with voice recognition software. Occasional wrong-word or "sound-a-like" substitutions may have occurred due to inherent limitations of voice recognition software. Read the chart carefully and recognize, using context, where substitutions have occurred."    Signed by:  Kelly Mooney MD           "

## 2023-08-25 NOTE — ASSESSMENT & PLAN NOTE
Risk and benefits were discussed with patient with diet medications. Patient should contact us if any adverse reaction occurs including but not limited to insomnia, tachycardia, anxiety, and/or weakness. Patient understands the importance of diet and exercise to improve his/her weight loss efforts. Consideration to stay on anti-obesity medications will be made at each office visit if patient is seeing benefits of such medication and shows improvement in overall metabolic health.

## 2023-08-25 NOTE — ASSESSMENT & PLAN NOTE
Seems well-controlled on current regimen.  We will look at some of the hormones when she comes back in six months.

## 2023-08-25 NOTE — ASSESSMENT & PLAN NOTE
Has been well-controlled on current regimen but as discussed previously, okay to stop the Onglyza as she is already taking GLP therapy

## 2023-09-18 DIAGNOSIS — E28.2 PCOS (POLYCYSTIC OVARIAN SYNDROME): ICD-10-CM

## 2023-09-18 DIAGNOSIS — E88.810 METABOLIC SYNDROME: ICD-10-CM

## 2023-09-19 RX ORDER — SEMAGLUTIDE 1.34 MG/ML
1 INJECTION, SOLUTION SUBCUTANEOUS
Qty: 3 ML | Refills: 5 | Status: SHIPPED | OUTPATIENT
Start: 2023-09-19 | End: 2023-12-01 | Stop reason: SDUPTHER

## 2023-10-09 DIAGNOSIS — L65.9 HAIR LOSS: ICD-10-CM

## 2023-10-10 RX ORDER — SPIRONOLACTONE 100 MG/1
150 TABLET, FILM COATED ORAL DAILY
Qty: 135 TABLET | Refills: 1 | Status: SHIPPED | OUTPATIENT
Start: 2023-10-10 | End: 2024-02-05 | Stop reason: SDUPTHER

## 2023-11-17 ENCOUNTER — TELEPHONE (OUTPATIENT)
Dept: PRIMARY CARE CLINIC | Facility: CLINIC | Age: 43
End: 2023-11-17
Payer: COMMERCIAL

## 2023-11-17 NOTE — TELEPHONE ENCOUNTER
----- Message from Yosvany Joshua sent at 11/17/2023  4:00 PM CST -----  Regarding: pt meds  Name of Who is Calling:Pt         What is the request in detail: Pt requesting prior authorization for script semaglutide (OZEMPIC) 1 mg/dose (4 mg/3 mL)           Can the clinic reply by [facility]: yes         What Number to Call Back if not in [facility]:Telephone Information:  Mobile          [phone]

## 2023-11-17 NOTE — TELEPHONE ENCOUNTER
Patient's insurance is no longer paying for ozempic. Patient is wanting to know if she needs to have new blood work done because she since has been off of onglyza, she has been having some symptoms since she has been off of it. Also, she is worried that if she isn't able to take ozempic anymore that she will start having problems with her sugar levels and A1C

## 2023-12-01 DIAGNOSIS — E28.2 PCOS (POLYCYSTIC OVARIAN SYNDROME): ICD-10-CM

## 2023-12-01 DIAGNOSIS — E88.810 METABOLIC SYNDROME: ICD-10-CM

## 2023-12-03 DIAGNOSIS — E78.41 ELEVATED LIPOPROTEIN(A): ICD-10-CM

## 2023-12-04 RX ORDER — DIETHYLPROPION HYDROCHLORIDE 75 MG/1
75 TABLET, EXTENDED RELEASE ORAL EVERY MORNING
Qty: 90 TABLET | Refills: 0 | Status: SHIPPED | OUTPATIENT
Start: 2023-12-04 | End: 2024-03-21 | Stop reason: SDUPTHER

## 2023-12-07 ENCOUNTER — TELEPHONE (OUTPATIENT)
Dept: PRIMARY CARE CLINIC | Facility: CLINIC | Age: 43
End: 2023-12-07
Payer: COMMERCIAL

## 2023-12-07 RX ORDER — SEMAGLUTIDE 1.34 MG/ML
1 INJECTION, SOLUTION SUBCUTANEOUS
Qty: 3 ML | Refills: 5 | Status: SHIPPED | OUTPATIENT
Start: 2023-12-07 | End: 2024-02-05

## 2023-12-07 NOTE — TELEPHONE ENCOUNTER
----- Message from Alona Munguia LPN sent at 12/7/2023 11:39 AM CST -----    ----- Message -----  From: Muna Diamond  Sent: 12/7/2023  10:42 AM CST  To: [facility] Staff    Patient is requesting a call back concerning the PA for a medication she was prescribed. Call back at [phone]

## 2023-12-07 NOTE — TELEPHONE ENCOUNTER
"Called pt- advised her that this was the notice we received " Message from [employer]: Drug is not covered by plan. This medication may be excluded from the patient's benefit"     Pt advised me that insurance needed an A1C showing diabetes or documented improvement in A1C. Pt stated will go get A1C redone and send over previous blood work. Advised pt that if insurance does not cover this medication this may not matter.   "

## 2023-12-08 ENCOUNTER — PATIENT MESSAGE (OUTPATIENT)
Dept: PRIMARY CARE CLINIC | Facility: CLINIC | Age: 43
End: 2023-12-08
Payer: COMMERCIAL

## 2023-12-12 LAB
ALBUMIN SERPL-MCNC: 4.4 G/DL (ref 3.6–5.1)
ALBUMIN/GLOB SERPL: 1.7 (CALC) (ref 1–2.5)
ALP SERPL-CCNC: 59 U/L (ref 31–125)
ALT SERPL-CCNC: 45 U/L (ref 6–29)
AST SERPL-CCNC: 34 U/L (ref 10–30)
BILIRUB SERPL-MCNC: 0.5 MG/DL (ref 0.2–1.2)
BUN SERPL-MCNC: 11 MG/DL (ref 7–25)
BUN/CREAT SERPL: ABNORMAL (CALC) (ref 6–22)
CALCIUM SERPL-MCNC: 9.2 MG/DL (ref 8.6–10.2)
CHLORIDE SERPL-SCNC: 104 MMOL/L (ref 98–110)
CHOLEST SERPL-MCNC: 205 MG/DL
CHOLEST/HDLC SERPL: 3.3 (CALC)
CO2 SERPL-SCNC: 28 MMOL/L (ref 20–32)
CREAT SERPL-MCNC: 0.77 MG/DL (ref 0.5–0.99)
DHEA-S SERPL-MCNC: 112 MCG/DL (ref 15–205)
EGFR: 97 ML/MIN/1.73M2
GLOBULIN SER CALC-MCNC: 2.6 G/DL (CALC) (ref 1.9–3.7)
GLUCOSE SERPL-MCNC: 88 MG/DL (ref 65–99)
HBA1C MFR BLD: 5.1 % OF TOTAL HGB
HDLC SERPL-MCNC: 63 MG/DL
LDLC SERPL CALC-MCNC: 119 MG/DL (CALC)
NONHDLC SERPL-MCNC: 142 MG/DL (CALC)
POTASSIUM SERPL-SCNC: 4 MMOL/L (ref 3.5–5.3)
PROT SERPL-MCNC: 7 G/DL (ref 6.1–8.1)
SODIUM SERPL-SCNC: 139 MMOL/L (ref 135–146)
TESTOST SERPL-MCNC: 28 NG/DL (ref 2–45)
TRIGL SERPL-MCNC: 120 MG/DL

## 2023-12-13 LAB
ERYTHROCYTE [DISTWIDTH] IN BLOOD BY AUTOMATED COUNT: 12.6 % (ref 11–15)
ESTRONE SERPL-MCNC: 67 PG/ML
HCT VFR BLD AUTO: 42.2 % (ref 35–45)
HGB BLD-MCNC: 13.9 G/DL (ref 11.7–15.5)
INSULIN SERPL-ACNC: 11.2 UIU/ML
MCH RBC QN AUTO: 30.8 PG (ref 27–33)
MCHC RBC AUTO-ENTMCNC: 32.9 G/DL (ref 32–36)
MCV RBC AUTO: 93.4 FL (ref 80–100)
PLATELET # BLD AUTO: 259 THOUSAND/UL (ref 140–400)
PMV BLD REES-ECKER: 10 FL (ref 7.5–12.5)
RBC # BLD AUTO: 4.52 MILLION/UL (ref 3.8–5.1)
WBC # BLD AUTO: 5.9 THOUSAND/UL (ref 3.8–10.8)

## 2023-12-16 DIAGNOSIS — E78.41 ELEVATED LIPOPROTEIN(A): ICD-10-CM

## 2023-12-19 RX ORDER — ME-TETRAHYDROFOLATE/B12/HRB236 1-1-500 MG
1 CAPSULE ORAL DAILY
Qty: 90 CAPSULE | Refills: 2 | Status: SHIPPED | OUTPATIENT
Start: 2023-12-19 | End: 2024-02-05 | Stop reason: SDUPTHER

## 2024-02-05 ENCOUNTER — OFFICE VISIT (OUTPATIENT)
Dept: PRIMARY CARE CLINIC | Facility: CLINIC | Age: 44
End: 2024-02-05
Payer: COMMERCIAL

## 2024-02-05 ENCOUNTER — PATIENT MESSAGE (OUTPATIENT)
Dept: PRIMARY CARE CLINIC | Facility: CLINIC | Age: 44
End: 2024-02-05

## 2024-02-05 VITALS
HEART RATE: 78 BPM | WEIGHT: 168 LBS | BODY MASS INDEX: 29.77 KG/M2 | OXYGEN SATURATION: 98 % | TEMPERATURE: 98 F | SYSTOLIC BLOOD PRESSURE: 120 MMHG | DIASTOLIC BLOOD PRESSURE: 82 MMHG | HEIGHT: 63 IN

## 2024-02-05 DIAGNOSIS — L65.9 HAIR LOSS: ICD-10-CM

## 2024-02-05 DIAGNOSIS — E28.2 PCOS (POLYCYSTIC OVARIAN SYNDROME): Primary | ICD-10-CM

## 2024-02-05 DIAGNOSIS — E55.9 VITAMIN D DEFICIENCY: ICD-10-CM

## 2024-02-05 DIAGNOSIS — E78.41 ELEVATED LIPOPROTEIN(A): ICD-10-CM

## 2024-02-05 DIAGNOSIS — E88.810 METABOLIC SYNDROME: ICD-10-CM

## 2024-02-05 DIAGNOSIS — R74.8 INCREASED LIVER ENZYMES: ICD-10-CM

## 2024-02-05 DIAGNOSIS — E78.2 MIXED HYPERLIPIDEMIA: ICD-10-CM

## 2024-02-05 PROCEDURE — 3008F BODY MASS INDEX DOCD: CPT | Mod: CPTII,S$GLB,, | Performed by: FAMILY MEDICINE

## 2024-02-05 PROCEDURE — 99215 OFFICE O/P EST HI 40 MIN: CPT | Mod: S$GLB,,, | Performed by: FAMILY MEDICINE

## 2024-02-05 PROCEDURE — 99999 PR PBB SHADOW E&M-EST. PATIENT-LVL III: CPT | Mod: PBBFAC,,, | Performed by: FAMILY MEDICINE

## 2024-02-05 PROCEDURE — 1159F MED LIST DOCD IN RCRD: CPT | Mod: CPTII,S$GLB,, | Performed by: FAMILY MEDICINE

## 2024-02-05 PROCEDURE — 3074F SYST BP LT 130 MM HG: CPT | Mod: CPTII,S$GLB,, | Performed by: FAMILY MEDICINE

## 2024-02-05 PROCEDURE — 3079F DIAST BP 80-89 MM HG: CPT | Mod: CPTII,S$GLB,, | Performed by: FAMILY MEDICINE

## 2024-02-05 RX ORDER — ASPIRIN 325 MG
50000 TABLET, DELAYED RELEASE (ENTERIC COATED) ORAL
Qty: 12 CAPSULE | Refills: 1 | Status: SHIPPED | OUTPATIENT
Start: 2024-02-05

## 2024-02-05 RX ORDER — SEMAGLUTIDE 1 MG/.5ML
1 INJECTION, SOLUTION SUBCUTANEOUS
Qty: 4 EACH | Refills: 5 | Status: SHIPPED | OUTPATIENT
Start: 2024-02-05 | End: 2024-02-06

## 2024-02-05 RX ORDER — ME-TETRAHYDROFOLATE/B12/HRB236 1-1-500 MG
1 CAPSULE ORAL DAILY
Qty: 90 CAPSULE | Refills: 2 | Status: SHIPPED | OUTPATIENT
Start: 2024-02-05

## 2024-02-05 RX ORDER — SEMAGLUTIDE 1 MG/.5ML
1 INJECTION, SOLUTION SUBCUTANEOUS
Qty: 4 EACH | Refills: 5 | Status: SHIPPED | OUTPATIENT
Start: 2024-02-05 | End: 2024-02-05 | Stop reason: SDUPTHER

## 2024-02-05 RX ORDER — SPIRONOLACTONE 100 MG/1
150 TABLET, FILM COATED ORAL DAILY
Qty: 135 TABLET | Refills: 1 | Status: SHIPPED | OUTPATIENT
Start: 2024-02-05

## 2024-02-05 RX ORDER — FINASTERIDE 1 MG/1
1 TABLET, FILM COATED ORAL DAILY
Qty: 90 TABLET | Refills: 1 | Status: SHIPPED | OUTPATIENT
Start: 2024-02-05

## 2024-02-05 NOTE — PROGRESS NOTES
"    [facility]       [address] [place] [address]      [phone]     Kelly Mooney MD         .      Office Visit  2024  [id number]      SUBJECTIVE     HPI:  Mello Carreon is a 43 y.o. female presents today in clinic for "Follow-up, Fatigue, Fussy, Headache, and Insomnia  ."   Patient is here for routine follow-up.  Since insurance stopped covering her GLP therapy, she is gained rapid amount of weight gain in such a short period of time with no changes in medication.  This is causing much to stress to her.  She is taking diet medication, but this is not helping with her weight loss efforts. Patient is very emotionally upset about this and it is now causing distress in her life/.  She is noticing more reactive hypoglycemic symptoms, more hair loss, and very many symptoms suggesting uncontrolled anxiety.  Her sleep is disturbed.  She stays tightened in her neck all the time.    Follow-up  Associated symptoms include fatigue and headaches.   Fatigue  Associated symptoms include fatigue and headaches.   Headache         ROS   Review of symptoms are negative except as noted.     PAST MEDICAL HISTORY     Past Medical History:   Diagnosis Date    Asthma     Endometriosis 1997    Insulin resistance 2015    PCOS (polycystic ovarian syndrome)        Past Surgical History:   Procedure Laterality Date    BLADDER SURGERY  1982     SECTION      FOOT SURGERY Left     5th toe     HYSTERECTOMY      LASER ABLATION      uterus ablation    TUBAL LIGATION         Social History     Socioeconomic History    Marital status:    Tobacco Use    Smoking status: Never    Smokeless tobacco: Never    Tobacco comments:     None   Substance and Sexual Activity    Alcohol use: No    Drug use: No    Sexual activity: Yes     Partners: Male     Birth control/protection: None     Comment:        Allergies as of 2024 - " "Reviewed 02/05/2024   Allergen Reaction Noted    Cefaclor Swelling 09/14/2015    Cephalexin Swelling 09/14/2015    Penicillins Anaphylaxis 05/27/2014    Sulfa (sulfonamide antibiotics) Swelling 09/14/2015    Sulfamethoxazole-trimethoprim Swelling 09/14/2015    Metformin  09/14/2015    Levofloxacin Palpitations 09/14/2015       HOME MEDS     Current Outpatient Medications   Medication Instructions    cholecalciferol (vitamin D3) 50,000 Units, Oral, Every 7 days    cyanocobalamin (VITAMIN B-12) 2,500 mcg, Oral    diethylpropion (TENUATE) 75 mg, Oral, Every morning    finasteride (PROPECIA) 1 mg, Oral, Daily    [id number] (RHEUMATE, WITH QUATREFOLIC,) 1-1-500 mg Cap 1 tablet, Oral, Daily    spironolactone (ALDACTONE) 150 mg, Oral, Daily    WEGOVY 1 mg, Subcutaneous, Every 7 days       The following were updated and reviewed by myself in the chart: medications, past medical history, past surgical history, family history, social history, and allergies.        Objective    OBJECTIVE   /82   Pulse 78   Temp 97.7 °F (36.5 °C)   Ht 5' 3" (1.6 m)   Wt 76.2 kg (167 lb 15.9 oz)   SpO2 98%   BMI 29.76 kg/m²     Wt Readings from Last 2 Encounters:   02/05/24 76.2 kg (167 lb 15.9 oz)   08/25/23 65.9 kg (145 lb 4.5 oz)       BP Readings from Last 2 Encounters:   02/05/24 120/82   08/25/23 120/74          Physical Exam  Vitals and nursing note reviewed.   Constitutional:       Appearance: Normal appearance. She is normal weight.   HENT:      Head: Normocephalic and atraumatic.      Nose: Nose normal.   Eyes:      Extraocular Movements: Extraocular movements intact.      Conjunctiva/sclera: Conjunctivae normal.      Pupils: Pupils are equal, round, and reactive to light.   Cardiovascular:      Rate and Rhythm: Normal rate and regular rhythm.   Pulmonary:      Effort: Pulmonary effort is normal.      Breath sounds: Normal breath sounds.   Abdominal:      General: Abdomen is flat.      Palpations: " Abdomen is soft.      Tenderness: There is no abdominal tenderness.   Musculoskeletal:         General: No swelling. Tenderness: Right hip bursitis.Normal range of motion.      Cervical back: Normal range of motion.   Lymphadenopathy:      Cervical: No cervical adenopathy.   Skin:     General: Skin is warm.      Findings: No lesion or rash.   Neurological:      General: No focal deficit present.      Mental Status: She is alert. Mental status is at baseline.   Psychiatric:         Mood and Affect: Mood is depressed. Affect is tearful.         Behavior: Behavior normal.               LABS      LAB RESULTS, IF AVAILABLE, ARE DISCUSSED WITH PATIENT AND POSTED TO PATIENT PORTAL     ASSESSMENT & PLAN     1. PCOS (polycystic ovarian syndrome)  Overview:  Common findings associated with PCOS are many but hormone imbalance in women is the defining feature of PCOS. Related symptoms include irregular periods, weight gain, infertility, acne and excess hair growth. Discussed how diet and/or medication can help. Medications and diet can be effective in controlling PCOS as well as other otc supplements such as kate-inositol choline, Glucorein HOP, DIM 200mg for androgenic symptoms, and saw palmetto 250mg for hair loss or thinning.    Unable to tolerate metformin.    2. Mixed hyperlipidemia  Overview:  Reviewed importance of advanced lipid profiles. Advise daily exercise. Diet is recommended. Patients are encouraged to obtain healthy BMI weight. Risks associated with high cholesterol are well established and include but are not limited to heart disease, stroke and peripheral vascular disease. Patient should not smoke. Goal LDL particle number is <1000 and ApoB <70. Basic LDL is below 100 or below 70 if diabetic. Some non-FDA approved dietary supplements that may be beneficial to patient include but is not limited to high fiber diet at least 30g daily; niacin ER non flush free variety 500mg-1,000mg; Omega-3 fish oil DHA+EPA =  1,000mg twice daily; baby dose aspirin 81mg; co-enzyme q10 500mg to help reduce statin-induced myalgia; red rice yeast 1200mg twice daily; berberine 1000mg-2000mg daily. Patient is encouraged to exercise routinely and adhere to heart healthy diet with Mediterranean diet showing the most consistent data to help with lipid management.      Liver enzymes now elevated, not taking any medications.  We will continue to monitor for now.  3. Elevated lipoprotein(a)  -     [id number] (RHEUMATE, WITH QUATREFOLIC,) 1-1-500 mg Cap; Take 1 tablet by mouth once daily.  Dispense: 90 capsule; Refill: 2    4. Metabolic syndrome  -     CBC Auto Differential; Future; Expected date: 02/05/2024  -     Comprehensive Metabolic Panel; Future; Expected date: 02/05/2024  -     Hemoglobin A1C; Future; Expected date: 02/05/2024  -     Insulin, Random; Future; Expected date: 02/05/2024  -     C-Peptide; Future; Expected date: 02/05/2024  -     CORTISOL, 8AM; Future; Expected date: 02/05/2024  Worsening since insurance is no longer covering GLP therapy.  She has had a major increase in her A1c just in the short time being off of GLP therapy.  We will repeat labs as see if she has now been in the prediabetic range.  5. BMI 29.0-29.9,adult  -     CORTISOL, 8AM; Future; Expected date: 02/05/2024  -     Discontinue: semaglutide, weight loss, (WEGOVY) 1 mg/0.5 mL PnIj; Inject 1 mg into the skin every 7 days.  Dispense: 4 each; Refill: 5  -     semaglutide, weight loss, (WEGOVY) 1 mg/0.5 mL PnIj; Inject 1 mg into the skin every 7 days.  Dispense: 4 each; Refill: 5  We will try to see if we get the obesity approved Wegovy approved with her insurance.  6. Increased liver enzymes  Question fatty liver  7. Vitamin D deficiency  -     cholecalciferol, vitamin D3, 1,250 mcg (50,000 unit) capsule; Take 1 capsule (50,000 Units total) by mouth every 7 days.  Dispense: 12 capsule; Refill: 1  Continue replacement therapy.  8. Hair loss  -      "finasteride (PROPECIA) 1 mg tablet; Take 1 tablet (1 mg total) by mouth once daily.  Dispense: 90 tablet; Refill: 1  -     spironolactone (ALDACTONE) 100 MG tablet; Take 1.5 tablets (150 mg total) by mouth once daily.  Dispense: 135 tablet; Refill: 1    Worsening, but do think this is related to stress.  Okay to stay on these medications and increase her protein.  Also advised her to really work on stress reduction.      I spent a total of 45 minutes face to face and non-face to face on the date of this visit.This includes time preparing to see the patient (eg, review of tests, notes), obtaining and/or reviewing additional history from an independent historian and/or outside medical records, documenting clinical information in the electronic health record, independently interpreting results and/or communicating results to the patient/family/caregiver, or care coordinator.      Disclaimer: Portions of this record may have been created with voice recognition software. Occasional wrong-word or "sound-a-like" substitutions may have occurred due to inherent limitations of voice recognition software. Read the chart carefully and recognize, using context, where substitutions have occurred."    Signed by:  Kelly Mooney MD           "

## 2024-02-08 LAB
ALBUMIN SERPL-MCNC: 4.2 G/DL (ref 3.6–5.1)
ALBUMIN/GLOB SERPL: 1.7 (CALC) (ref 1–2.5)
ALP SERPL-CCNC: 60 U/L (ref 31–125)
ALT SERPL-CCNC: 51 U/L (ref 6–29)
AST SERPL-CCNC: 41 U/L (ref 10–30)
BASOPHILS # BLD AUTO: 27 CELLS/UL (ref 0–200)
BASOPHILS NFR BLD AUTO: 0.4 %
BILIRUB SERPL-MCNC: 0.6 MG/DL (ref 0.2–1.2)
BUN SERPL-MCNC: 14 MG/DL (ref 7–25)
BUN/CREAT SERPL: ABNORMAL (CALC) (ref 6–22)
C PEPTIDE SERPL-MCNC: 1.82 NG/ML (ref 0.8–3.85)
CALCIUM SERPL-MCNC: 8.9 MG/DL (ref 8.6–10.2)
CHLORIDE SERPL-SCNC: 104 MMOL/L (ref 98–110)
CO2 SERPL-SCNC: 28 MMOL/L (ref 20–32)
CORTIS AM PEAK SERPL-MCNC: 11 MCG/DL
CREAT SERPL-MCNC: 0.76 MG/DL (ref 0.5–0.99)
EGFR: 99 ML/MIN/1.73M2
EOSINOPHIL # BLD AUTO: 211 CELLS/UL (ref 15–500)
EOSINOPHIL NFR BLD AUTO: 3.1 %
ERYTHROCYTE [DISTWIDTH] IN BLOOD BY AUTOMATED COUNT: 12.7 % (ref 11–15)
GLOBULIN SER CALC-MCNC: 2.5 G/DL (CALC) (ref 1.9–3.7)
GLUCOSE SERPL-MCNC: 89 MG/DL (ref 65–99)
HBA1C MFR BLD: 5.5 % OF TOTAL HGB
HCT VFR BLD AUTO: 42.1 % (ref 35–45)
HGB BLD-MCNC: 13.9 G/DL (ref 11.7–15.5)
INSULIN SERPL-ACNC: 8.7 UIU/ML
LYMPHOCYTES # BLD AUTO: 1877 CELLS/UL (ref 850–3900)
LYMPHOCYTES NFR BLD AUTO: 27.6 %
MCH RBC QN AUTO: 30.5 PG (ref 27–33)
MCHC RBC AUTO-ENTMCNC: 33 G/DL (ref 32–36)
MCV RBC AUTO: 92.5 FL (ref 80–100)
MONOCYTES # BLD AUTO: 428 CELLS/UL (ref 200–950)
MONOCYTES NFR BLD AUTO: 6.3 %
NEUTROPHILS # BLD AUTO: 4257 CELLS/UL (ref 1500–7800)
NEUTROPHILS NFR BLD AUTO: 62.6 %
PLATELET # BLD AUTO: 283 THOUSAND/UL (ref 140–400)
PMV BLD REES-ECKER: 9.8 FL (ref 7.5–12.5)
POTASSIUM SERPL-SCNC: 4 MMOL/L (ref 3.5–5.3)
PROT SERPL-MCNC: 6.7 G/DL (ref 6.1–8.1)
RBC # BLD AUTO: 4.55 MILLION/UL (ref 3.8–5.1)
SODIUM SERPL-SCNC: 139 MMOL/L (ref 135–146)
WBC # BLD AUTO: 6.8 THOUSAND/UL (ref 3.8–10.8)

## 2024-02-09 ENCOUNTER — PATIENT MESSAGE (OUTPATIENT)
Dept: PRIMARY CARE CLINIC | Facility: CLINIC | Age: 44
End: 2024-02-09
Payer: COMMERCIAL

## 2024-03-21 DIAGNOSIS — E78.41 ELEVATED LIPOPROTEIN(A): ICD-10-CM

## 2024-03-21 RX ORDER — DIETHYLPROPION HYDROCHLORIDE 75 MG/1
75 TABLET, EXTENDED RELEASE ORAL EVERY MORNING
Qty: 90 TABLET | Refills: 0 | Status: SHIPPED | OUTPATIENT
Start: 2024-03-21

## 2024-04-09 ENCOUNTER — TELEPHONE (OUTPATIENT)
Dept: PHARMACY | Facility: CLINIC | Age: 44
End: 2024-04-09
Payer: COMMERCIAL